# Patient Record
Sex: MALE | Race: WHITE | NOT HISPANIC OR LATINO | URBAN - METROPOLITAN AREA
[De-identification: names, ages, dates, MRNs, and addresses within clinical notes are randomized per-mention and may not be internally consistent; named-entity substitution may affect disease eponyms.]

---

## 2022-07-22 DIAGNOSIS — R30.0 DYSURIA: Primary | ICD-10-CM

## 2022-09-08 ENCOUNTER — OFFICE VISIT (OUTPATIENT)
Dept: UROLOGY | Facility: CLINIC | Age: 87
End: 2022-09-08
Payer: MEDICARE

## 2022-09-08 VITALS
WEIGHT: 180 LBS | SYSTOLIC BLOOD PRESSURE: 178 MMHG | HEIGHT: 69 IN | HEART RATE: 97 BPM | BODY MASS INDEX: 26.66 KG/M2 | DIASTOLIC BLOOD PRESSURE: 75 MMHG

## 2022-09-08 DIAGNOSIS — N39.0 FREQUENT UTI: ICD-10-CM

## 2022-09-08 LAB — POC RESIDUAL URINE VOLUME: 7 ML (ref 0–100)

## 2022-09-08 PROCEDURE — 99203 OFFICE O/P NEW LOW 30 MIN: CPT | Mod: S$GLB,,, | Performed by: NURSE PRACTITIONER

## 2022-09-08 PROCEDURE — 51798 US URINE CAPACITY MEASURE: CPT | Mod: S$GLB,,, | Performed by: NURSE PRACTITIONER

## 2022-09-08 PROCEDURE — 51798 POCT BLADDER SCAN: ICD-10-PCS | Mod: S$GLB,,, | Performed by: NURSE PRACTITIONER

## 2022-09-08 PROCEDURE — 99203 PR OFFICE/OUTPT VISIT, NEW, LEVL III, 30-44 MIN: ICD-10-PCS | Mod: S$GLB,,, | Performed by: NURSE PRACTITIONER

## 2022-09-08 RX ORDER — HYDRALAZINE HYDROCHLORIDE 25 MG/1
TABLET, FILM COATED ORAL
COMMUNITY

## 2022-09-08 RX ORDER — VITAMIN B COMPLEX
TABLET ORAL
COMMUNITY

## 2022-09-08 RX ORDER — THIAMINE HCL 50 MG
TABLET ORAL
COMMUNITY

## 2022-09-08 RX ORDER — CLONIDINE HYDROCHLORIDE 0.2 MG/1
TABLET ORAL
COMMUNITY

## 2022-09-08 RX ORDER — FUROSEMIDE 40 MG/1
TABLET ORAL
COMMUNITY
Start: 2022-07-06

## 2022-09-08 RX ORDER — ATORVASTATIN CALCIUM 40 MG/1
TABLET, FILM COATED ORAL
COMMUNITY

## 2022-09-08 RX ORDER — PYRIDOXINE HCL (VITAMIN B6) 25 MG
TABLET ORAL
COMMUNITY

## 2022-09-08 RX ORDER — LEVOTHYROXINE SODIUM 112 UG/1
TABLET ORAL
COMMUNITY
Start: 2022-08-27

## 2022-09-08 NOTE — PROGRESS NOTES
Subjective:       Patient ID: Gareth Delcid is a 87 y.o. male.    Chief Complaint: Urinary Tract Infection      HPI: 87-year-old male, new to [facility], presents with complaint of frequent UTIs.    Patient's daughter states patient has had approximately 6 UTIs since July 2021.    He will typically have burning with urination.  He may have some episodes of frequency and urgency.    Patient states that at 1 time he got really sick and had to go to the hospital.      At this time patient is not having pain significant burning urination (he does tend to have mild burning).  Denies any odor urine.  Denies any fever or body aches.  Denies any blood in urine.  Denies any frequency urgency.    No other urinary complaints at this time.       Past Medical History:   Past Medical History:   Diagnosis Date    Colon cancer     Coronary artery disease     Hypertension     Kidney stone     Prostate cancer     Thyroid disease        Past Surgical Historical:   Past Surgical History:   Procedure Laterality Date    BACK SURGERY      CATARACT EXTRACTION      COLON SURGERY      heart stent      HERNIA REPAIR      PROSTATECTOMY      THYROIDECTOMY          Medications:   Medication List with Changes/Refills   Current Medications    ATORVASTATIN (LIPITOR) 40 MG TABLET        CLONIDINE (CATAPRES) 0.2 MG TABLET        CYANOCOBALAMIN, VITAMIN B-12, (VITAMIN B-12) 2,500 MCG SUBL        FUROSEMIDE (LASIX) 40 MG TABLET        HYDRALAZINE (APRESOLINE) 25 MG TABLET        LEVOTHYROXINE (SYNTHROID) 112 MCG TABLET        PYRIDOXINE, VITAMIN B6, (VITAMIN B-6) 25 MG TAB        THIAMINE (VITAMIN B-1) 50 MG TABLET            Past Social History:   Social History     Socioeconomic History    Marital status:    Tobacco Use    Smoking status: Former     Types: Cigarettes    Smokeless tobacco: Never       Allergies: Review of patient's allergies indicates:  No Known Allergies     Family History: History reviewed. No pertinent family history.      Review of Systems:  Review of Systems   Constitutional:  Negative for activity change and appetite change.   HENT:  Negative for congestion and dental problem.    Respiratory:  Negative for chest tightness and shortness of breath.    Cardiovascular:  Negative for chest pain.   Gastrointestinal:  Negative for abdominal distention and abdominal pain.   Genitourinary:  Negative for decreased urine volume, difficulty urinating, dysuria, enuresis, flank pain, frequency, genital sores, hematuria, penile discharge, penile pain, penile swelling, scrotal swelling, testicular pain and urgency.   Musculoskeletal:  Negative for back pain and neck pain.   Neurological:  Negative for dizziness.   Hematological:  Negative for adenopathy.   Psychiatric/Behavioral:  Negative for agitation, behavioral problems and confusion.      Physical Exam:  Physical Exam  Vitals and nursing note reviewed.   Constitutional:       Appearance: He is well-developed.   HENT:      Head: Normocephalic.   Cardiovascular:      Rate and Rhythm: Normal rate and regular rhythm.      Heart sounds: Normal heart sounds.   Pulmonary:      Effort: Pulmonary effort is normal.      Breath sounds: Normal breath sounds.   Abdominal:      General: Bowel sounds are normal.      Palpations: Abdomen is soft.   Skin:     General: Skin is warm and dry.   Neurological:      Mental Status: He is alert and oriented to person, place, and time.     Bladder scan:  7 cc   Urinalysis: Trace leukocytes, white blood cells to 6, epithelial +/- , bacteria +/-    Assessment/Plan:   Frequent UTIs:  Analysis leuks good today.    Will schedule patient for renal ultrasound.    Encouraged to maintain hydration.  Encouraged to not hold bladder.    Started notify us for any symptoms of infection.    They live in [place] and would like to do urine drop also at the lab station in [place].      Follow-up pending ultrasound.  Problem List Items Addressed This Visit    None  Visit Diagnoses        Frequent UTI        Relevant Orders    POCT Urinalysis (w/Micro Option)    POCT Bladder Scan (Completed)    US Retroperitoneal Complete

## 2023-05-23 ENCOUNTER — PATIENT MESSAGE (OUTPATIENT)
Dept: RESEARCH | Facility: HOSPITAL | Age: 88
End: 2023-05-23
Payer: MEDICARE